# Patient Record
Sex: MALE | Race: WHITE | Employment: STUDENT | URBAN - METROPOLITAN AREA
[De-identification: names, ages, dates, MRNs, and addresses within clinical notes are randomized per-mention and may not be internally consistent; named-entity substitution may affect disease eponyms.]

---

## 2019-09-24 ENCOUNTER — OFFICE VISIT (OUTPATIENT)
Dept: ORTHOPEDIC SURGERY | Age: 24
End: 2019-09-24
Payer: COMMERCIAL

## 2019-09-24 VITALS — BODY MASS INDEX: 29.82 KG/M2 | WEIGHT: 225 LBS | HEIGHT: 73 IN

## 2019-09-24 DIAGNOSIS — M25.571 RIGHT ANKLE PAIN, UNSPECIFIED CHRONICITY: ICD-10-CM

## 2019-09-24 DIAGNOSIS — S93.491A SPRAIN OF ANTERIOR TALOFIBULAR LIGAMENT OF RIGHT ANKLE, INITIAL ENCOUNTER: Primary | ICD-10-CM

## 2019-09-24 PROCEDURE — L1902 AFO ANKLE GAUNTLET PRE OTS: HCPCS | Performed by: PHYSICIAN ASSISTANT

## 2019-09-24 PROCEDURE — 99203 OFFICE O/P NEW LOW 30 MIN: CPT | Performed by: PHYSICIAN ASSISTANT

## 2019-09-24 NOTE — PROGRESS NOTES
recognition software. If there are any questions about the content of this document, please contact me as some errors in transcription may have occurred.

## 2024-10-25 ENCOUNTER — OFFICE VISIT (OUTPATIENT)
Dept: ORTHOPEDIC SURGERY | Age: 29
End: 2024-10-25

## 2024-10-25 VITALS — BODY MASS INDEX: 29.82 KG/M2 | HEIGHT: 73 IN | WEIGHT: 225 LBS

## 2024-10-25 DIAGNOSIS — S46.912A SHOULDER STRAIN, LEFT, INITIAL ENCOUNTER: ICD-10-CM

## 2024-10-25 DIAGNOSIS — S46.912A STRAIN OF LEFT SHOULDER, INITIAL ENCOUNTER: ICD-10-CM

## 2024-10-25 DIAGNOSIS — R52 PAIN: Primary | ICD-10-CM

## 2024-10-25 NOTE — PROGRESS NOTES
Dr Justen Mcintosh      Date /Time 10/25/2024             3:06 PM EDT  Name Carlton Green             1995   Location  [facility]  MRN [id number]                Chief Complaint   Patient presents with    New Patient     N Left Shoulder          History of Present Illness    Carlton Green is a 29 y.o. male who presents with  left Shoulder pain.    Sent in consultation by Bahman Bowden MD, .      Occupation:  Physical therapist at [employer]  Occupational activities: heavy lifting occasionally.  Injury Mechanism: Weight lifting.  Worker's Comp. & legal issues:   none.  Previous Treatments: Ice, Heat, and NSAIDs    Patient presents to the office today for a new problem.  Patient here with a chief complaint of left shoulder pain.  Patient's left shoulder became painful while he was weightlifting.  He was bench pressing and felt a pop in his shoulder.  He has felt a roll over the anterior portion of his shoulder several more times while working out.  He has been doing home exercises without improvement.    Past Medical History  Past Medical History:   Diagnosis Date    Allergy ALL YEAR      Past Surgical History:   Procedure Laterality Date    NASAL FRACTURE SURGERY  1/9/2015    CLOSED REDUCTION NASAL FRACTURE        SMALL INTESTINE SURGERY      AS A CHILD, 2-3 YEARS OLD     Social History     Tobacco Use    Smoking status: Never    Smokeless tobacco: Never   Substance Use Topics    Alcohol use: No      No current outpatient medications on file prior to visit.     No current facility-administered medications on file prior to visit.        ASCVD 10-YEAR RISK SCORE  The ASCVD Risk score (Turner DK, et al., 2019) failed to calculate for the following reasons:    The 2019 ASCVD risk score is only valid for ages 40 to 79     Review of Systems  10-point ROS is negative other than HPI.    Physical Exam  Based off 1997 Exam Criteria  Ht 1.854 m (6' 0.99\")   Wt 102.1 kg (225 lb)   BMI 29.69

## 2024-10-28 ENCOUNTER — TELEPHONE (OUTPATIENT)
Dept: ORTHOPEDIC SURGERY | Age: 29
End: 2024-10-28

## 2024-10-28 NOTE — TELEPHONE ENCOUNTER
Approval for MRI: of Lf Shoulder   Approval Letter in Media   Approved Facility [facility]   Approval Dates: 10.28.24 to 11.26.24   Auth #: [id number]

## 2024-11-01 ENCOUNTER — HOSPITAL ENCOUNTER (OUTPATIENT)
Dept: MRI IMAGING | Age: 29
Discharge: HOME | End: 2024-11-01
Attending: ORTHOPAEDIC SURGERY
Payer: COMMERCIAL

## 2024-11-01 DIAGNOSIS — S46.912A SHOULDER STRAIN, LEFT, INITIAL ENCOUNTER: ICD-10-CM

## 2024-11-01 PROCEDURE — 73221 MRI JOINT UPR EXTREM W/O DYE: CPT
